# Patient Record
Sex: MALE | Race: WHITE | NOT HISPANIC OR LATINO | Employment: OTHER | URBAN - METROPOLITAN AREA
[De-identification: names, ages, dates, MRNs, and addresses within clinical notes are randomized per-mention and may not be internally consistent; named-entity substitution may affect disease eponyms.]

---

## 2020-10-14 ENCOUNTER — SURGERY/PROCEDURE (OUTPATIENT)
Dept: URBAN - METROPOLITAN AREA SURGERY 14 | Facility: SURGERY | Age: 75
End: 2020-10-14

## 2020-10-14 ENCOUNTER — ESTABLISHED PATIENT (OUTPATIENT)
Dept: URBAN - METROPOLITAN AREA CLINIC 39 | Facility: CLINIC | Age: 75
End: 2020-10-14

## 2020-10-14 DIAGNOSIS — H26.493: ICD-10-CM

## 2020-10-14 PROCEDURE — 92004 COMPRE OPH EXAM NEW PT 1/>: CPT

## 2020-10-14 PROCEDURE — 6682150 YAG CAPSULOTOMY

## 2020-10-14 ASSESSMENT — VISUAL ACUITY
OS_BAT: 20/70
OD_SC: 20/80
OS_SC: 20/60
OD_BAT: 20/80

## 2020-10-14 ASSESSMENT — TONOMETRY
OD_IOP_MMHG: 11
OS_IOP_MMHG: 13

## 2021-02-19 NOTE — PATIENT DISCUSSION
The patient was informed that depending on the repeat measurements, a toric lens may be recommended due to the amount of corneal astigmatism. The patient understands there is a possibility they may need a lens rotation or enhancement after surgery.

## 2021-02-19 NOTE — PATIENT DISCUSSION
The patient was informed that with Symfony blended vision, the near focal point will be at approximately 20 inches with the first eye. Near vision will be achieved with surgery on the second eye. Side effects, specifically halos, reduced contrast, and a 1 in 500 exchange rate due to failure to adapt to the lens were discussed as was the need for enhancement in some cases. The patient elects Symfony OD(+/- Toric), goal of emmetropia.

## 2021-03-03 NOTE — PATIENT DISCUSSION
Patient advised of the right to post-operative care by the surgeon. Patient is fully informed of, and agreed to, co-management with their primary optometric physician. Post-operative care by the surgeon is not medically necessary and co-management is clinically appropriate. Patient has received itemization of fees related to cataract surgery. Transfer of care letter completed for the patient. Transfer care of right eye to Dr. Masood Cardenas on 3/3/2021. Patient instructed to call immediately if any new distortion, blurring, decreased vision or eye pain.

## 2021-03-08 NOTE — PATIENT DISCUSSION
Cataract surgery has been performed in the first eye and activities of daily living are still impaired. The patient would like to proceed with cataract surgery in the second eye as scheduled. The patient elects TMF +325 OS, goal of emmetropia.